# Patient Record
Sex: FEMALE | Race: WHITE
[De-identification: names, ages, dates, MRNs, and addresses within clinical notes are randomized per-mention and may not be internally consistent; named-entity substitution may affect disease eponyms.]

---

## 2017-05-23 ENCOUNTER — HOSPITAL ENCOUNTER (EMERGENCY)
Dept: HOSPITAL 56 - MW.ED | Age: 27
Discharge: HOME | End: 2017-05-23
Payer: COMMERCIAL

## 2017-05-23 VITALS — DIASTOLIC BLOOD PRESSURE: 70 MMHG | SYSTOLIC BLOOD PRESSURE: 131 MMHG
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
## 2017-05-23 DIAGNOSIS — J45.909: ICD-10-CM

## 2017-05-23 DIAGNOSIS — Z90.49: ICD-10-CM

## 2017-05-23 DIAGNOSIS — F17.210: ICD-10-CM
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
## 2017-05-23 DIAGNOSIS — Z88.5: ICD-10-CM

## 2017-05-23 DIAGNOSIS — S69.92XA: Primary | ICD-10-CM

## 2017-05-23 DIAGNOSIS — Z88.2: ICD-10-CM

## 2017-05-23 NOTE — EDM.PDOC
ED HPI GENERAL MEDICAL PROBLEM





- General


Chief Complaint: Upper Extremity Injury/Pain


Stated Complaint: HURT LEFT WRIST


Time Seen by Provider: 05/23/17 17:14





- History of Present Illness


INITIAL COMMENTS - FREE TEXT/NARRATIVE: 





HISTORY AND PHYSICAL:





History of present illness:


Patient is 27-year-old white female she is a concern of acute left wrist injury 

this occurred at work when she slipped striking her left wrist she denies other 

trauma or concern





Review of systems: 


As per history of present illness and below otherwise all systems reviewed and 

negative.





Past medical history: 


As per history of present illness and as reviewed below otherwise 

noncontributory.





Surgical history: 


As per history of present illness and as reviewed below otherwise 

noncontributory.





Social history: 


No reported history of drug or alcohol abuse.





Family history: 


As per history of present illness and as reviewed below otherwise 

noncontributory.





Physical exam:


HEENT: Atraumatic, normocephalic, pupils reactive, negative for conjunctival 

pallor or scleral icterus, mucous membranes moist, throat clear, neck supple, 

nontender, trachea midline.


Lungs: Clear to auscultation, breath sounds equal bilaterally, chest nontender.


Heart: S1S2, regular, negative for clicks, rubs, or JVD.


Abdomen: Soft, nondistended, nontender. Negative for masses or 

hepatosplenomegaly. Negative for costovertebral tenderness.


Pelvis: Stable nontender.


Genitourinary: Deferred.


Rectal: Deferred.


Extremities: No swelling noted just mild tenderness in the lateral aspect of 

her wrist to palpation and no crepitation no point tenderness no snuffbox 

tenderness CMS and neurovascular exams unremarkable


Neuro: Awake, alert, oriented. Cranial nerves II through XII unremarkable. 

Cerebellum unremarkable. Motor and sensory unremarkable throughout. Exam 

nonfocal.





Diagnostics:


X-ray left wrist





Therapeutics:


Be determined





Impression: 


#1 acute left wrist injury





Definitive disposition and diagnosis as appropriate pending reevaluation and 

review of above.





- Related Data


 Allergies











Allergy/AdvReac Type Severity Reaction Status Date / Time


 


morphine Allergy  Vomiting Verified 02/28/16 10:42


 


Sulfa (Sulfonamide Allergy  Rash Verified 02/28/16 10:42





Antibiotics)     











Home Meds: 


 Home Meds





Albuterol Sulfate [Albuterol Sulfate HFA] 8.5 gm IH Q6H PRN 06/30/14 [History]


Albuterol [Proventil Neb Soln] 0.63 mg NEB Q6H 06/30/14 [History]











Past Medical History


Respiratory History: Reports: Asthma


Other OB/BYN History: hysterectomy





- Past Surgical History


GI Surgical History: Reports: Cholecystectomy





Social & Family History





- Family History


Family Medical History: Noncontributory





- Tobacco Use


Smoking Status *Q: Current Every Day Smoker


Years of Tobacco use: 12


Packs/Tins Daily: 0.5


Second Hand Smoke Exposure: No





- Alcohol Use


Days Per Week of Alcohol Use: 0





- Recreational Drug Use


Recreational Drug Use: No





Review of Systems





- Review of Systems


Review Of Systems: ROS reveals no pertinent complaints other than HPI.





Trauma Exam





- Physical Exam


Exam: See Below (See dictation)





Course





- Orders/Labs/Meds


Orders: 





 Active Orders 24 hr











 Category Date Time Status


 


 Wrist Comp Min 3V Lt [CR] Stat Exams  05/23/17 17:16 Ordered














Departure





- Departure


Time of Disposition: 17:19


Disposition: Home, Self-Care 01


Condition: good


Clinical Impression: 


 Wrist injury








- Discharge Information


Forms:  ED Department Discharge


Additional Instructions: 


The following information is given to patients seen in the emergency department 

who are being discharged to home. This information is to outline your options 

for follow-up care. We provide all patients seen in our emergency department 

with a follow-up referral.





The need for follow-up, as well as the timing and circumstances, are variable 

depending upon the specifics of your emergency department visit.





If you don't have a primary care physician on staff, we will provide you with a 

referral. We always advise you to contact your personal physician following an 

emergency department visit to inform them of the circumstance of the visit and 

for follow-up with them and/or the need for any referrals to a consulting 

specialist.





The emergency department will also refer you to a specialist when appropriate. 

This referral assures that you have the opportunity for followup care with a 

specialist. All of these measure are taken in an effort to provide you with 

optimal care, which includes your followup.





Under all circumstances we always encourage you to contact your private 

physician who remains a resource for coordinating  your care. When calling for 

followup care, please make the office aware that this follow-up is from your 

recent emergency room visit. If for any reason you are refused follow-up, 

please contact the [facility] emergency department at [phone] 

and asked to speak to the emergency department charge nurse.




















Ace wrap as directed Motrin or Tylenol as directed return as needed as 

discussed follow up primary medical doctor in one to 2 days








- My Orders


Last 24 Hours: 





My Active Orders





05/23/17 17:16


Wrist Comp Min 3V Lt [CR] Stat 














- Assessment/Plan


Last 24 Hours: 





My Active Orders





05/23/17 17:16


Wrist Comp Min 3V Lt [CR] Stat

## 2017-05-24 NOTE — CR
EXAM DATE: 17



PATIENT'S AGE: 27



Patient: SURY MASON



Facility: [place], [place]

Patient ID: [id number]

Site Patient ID: [id number].

Site Accession #: [id number].

: 1990

Study: XRay Extremity wrist [id number]/23/2017 5:51:09 PM

Ordering Physician: Jose Evans



Final Report: 

HISTORY:

Fall, left wrist pain.



TECHNIQUE:

Three views of left wrist.



COMPARISON:

No prior.



FINDINGS:

No acute fracture or acute malalignment. Joint spaces maintained. Ulnar minus 
variance. No radiopaque foreign body or abnormal soft tissue gas.



IMPRESSION:

1. No acute fracture.

2. Ulnar minus variance.



Dictated by Gabriele Lewis MD @ 2017 6:22:20 PM



Dictated by: Gabriele Lewis MD @ 2017 18:22:30

(Electronic Signature)



Report Signed by Proxy.
ANNE

## 2018-01-08 ENCOUNTER — HOSPITAL ENCOUNTER (EMERGENCY)
Dept: HOSPITAL 56 - MW.ED | Age: 28
Discharge: HOME | End: 2018-01-08
Payer: COMMERCIAL

## 2018-01-08 VITALS — SYSTOLIC BLOOD PRESSURE: 162 MMHG | DIASTOLIC BLOOD PRESSURE: 92 MMHG

## 2018-01-08 DIAGNOSIS — J45.909: Primary | ICD-10-CM

## 2018-01-08 DIAGNOSIS — Z88.2: ICD-10-CM

## 2018-01-08 DIAGNOSIS — J06.9: ICD-10-CM

## 2018-01-08 DIAGNOSIS — Z88.5: ICD-10-CM

## 2018-01-08 DIAGNOSIS — F17.210: ICD-10-CM

## 2018-01-08 NOTE — EDM.PDOC
ED HPI GENERAL MEDICAL PROBLEM





- General


Chief Complaint: General


Stated Complaint: BODYACHES


Time Seen by Provider: 01/08/18 08:29


Source of Information: Reports: Patient


History Limitations: Reports: No Limitations





- History of Present Illness


INITIAL COMMENTS - FREE TEXT/NARRATIVE: 


History of present illness:


[]Patient is at 4 days of cough, sore throat, hot and cold flashes and sinus 

congestion. Patient was sent home from work and told to be evaluated by a 

physician. She was unable to get into her primary care physician's office. 

Patient does use albuterol inhaler and has a nebulizer at home which she has 

run out of her solution. Patient is also a smoker. She denies any vomiting, 

diarrhea or body aches.





Review of systems: 


As per history of present illness and below otherwise all systems reviewed and 

negative.





Past medical history: 


As per history of present illness and as reviewed below otherwise 

noncontributory.





Surgical history: 


As per history of present illness and as reviewed below otherwise 

noncontributory.





Social history: 


No reported history of drug or alcohol abuse.





Family history: 


As per history of present illness and as reviewed below otherwise 

noncontributory.





Physical exam:


General: Well developed, well nourished in NAD


HEENT: Atraumatic, normocephalic, pupils reactive, negative for conjunctival 

pallor or scleral icterus, mucous membranes moist, throat clear, neck supple, 

nontender, trachea midline.


Lungs: Clear to auscultation, breath sounds equal bilaterally, chest nontender.


Heart: S1S2, regular, negative for clicks, rubs, or JVD.


Abdomen: Soft, nondistended, nontender. Negative for masses or 

hepatosplenomegaly. Negative for costovertebral tenderness.


Pelvis: Stable nontender.


Genitourinary: Deferred.


Rectal: Deferred.


Extremities: Atraumatic, negative for cords or calf pain. Neurovascular 

unremarkable.


Neuro: Awake, alert, oriented. Cranial nerves II through XII unremarkable. 

Cerebellum unremarkable. Motor and sensory unremarkable throughout. Exam 

nonfocal.





Diagnostics:


[]





Therapeutics:


[]





Impression: 


[]Upper respiratory infection, asthma





Plan:


[]Med refill for albuterol solution and inhaler and Z-Pipo.





Definitive disposition and diagnosis as appropriate pending reevaluation and 

review of above.





  ** Bilateral Generalized


Pain Score (Numeric/FACES): 4





- Related Data


 Allergies











Allergy/AdvReac Type Severity Reaction Status Date / Time


 


codeine Allergy  Hives Verified 01/08/18 08:25


 


morphine Allergy  Vomiting Verified 01/08/18 08:25


 


Sulfa (Sulfonamide Allergy  Rash Verified 01/08/18 08:25





Antibiotics)     











Home Meds: 


 Home Meds





Albuterol Sulfate [Albuterol Sulfate HFA] 8.5 gm IH Q6H PRN 06/30/14 [History]


Albuterol [Proventil Neb Soln] 0.63 mg NEB Q6H 06/30/14 [History]


Albuterol Sulfate 0.63 mg IH Q4HR PRN #20 ml 01/08/18 [Rx]


Albuterol Sulfate [Ventolin Hfa] 8 gm IH Q4HR PRN #1 hfa.aer.ad 01/08/18 [Rx]


Azithromycin [Zithromax] 250 mg PO ASDIRECTED #6 tablet 01/08/18 [Rx]











Past Medical History


Respiratory History: Reports: Asthma


Other OB/BYN History: hysterectomy





- Past Surgical History


GI Surgical History: Reports: Cholecystectomy





Social & Family History





- Family History


Family Medical History: Noncontributory





- Tobacco Use


Smoking Status *Q: Current Every Day Smoker


Years of Tobacco use: 16


Packs/Tins Daily: 0.5


Second Hand Smoke Exposure: No





- Caffeine Use


Caffeine Use: Reports: Coffee, Energy Drinks





- Alcohol Use


Days Per Week of Alcohol Use: 0





- Recreational Drug Use


Recreational Drug Use: No





ED ROS GENERAL





- Review of Systems


Review Of Systems: See Below (See history of present illness)





ED EXAM, GENERAL





- Physical Exam


Exam: See Below (See history of present illness)





Course





- Vital Signs


Last Recorded V/S: 





 Last Vital Signs











Temp  96.8 F   01/08/18 08:21


 


Pulse  56 L  01/08/18 08:21


 


Resp  18   01/08/18 08:21


 


BP  133/63   01/08/18 08:21


 


Pulse Ox  98   01/08/18 08:21














Departure





- Departure


Time of Disposition: 08:41


Disposition: Home, Self-Care 01


Condition: Good


Clinical Impression: 


 Upper respiratory infection








- Discharge Information


Prescriptions: 


Albuterol Sulfate 0.63 mg IH Q4HR PRN #20 ml


 PRN Reason: Shortness Of Breath


Albuterol Sulfate [Ventolin Hfa] 8 gm IH Q4HR PRN #1 hfa.aer.ad


 PRN Reason: Shortness Of Breath


Azithromycin [Zithromax] 250 mg PO ASDIRECTED #6 tablet


Referrals: 


Yudi Santiago DO [Primary Care Provider] - 


Additional Instructions: 


The following information is given to patients seen in the emergency department 

who are being discharged to home. This information is to outline your options 

for follow-up care. We provide all patients seen in our emergency department 

with a follow-up referral.





The need for follow-up, as well as the timing and circumstances, are variable 

depending upon the specifics of your emergency department visit.





If you don't have a primary care physician on staff, we will provide you with a 

referral. We always advise you to contact your personal physician following an 

emergency department visit to inform them of the circumstance of the visit and 

for follow-up with them and/or the need for any referrals to a consulting 

specialist.





The emergency department will also refer you to a specialist when appropriate. 

This referral assures that you have the opportunity for follow-up care with a 

specialist. All of these measure are taken in an effort to provide you with 

optimal care, which includes your follow-up.





Under all circumstances we always encourage you to contact your private 

physician who remains a resource for coordinating your care. When calling for 

follow-up care, please make the office aware that this follow-up is from your 

recent emergency room visit. If for any reason you are refused follow-up, 

please contact the [facility] Emergency 

Department at [phone] and asked to speak to the emergency department 

charge nurse.





Use albuterol and Zithromax as directed follow-up with PMD return if symptoms 

worsen or change. 


[facility]


Primary Care


[address] [place] [id number]


Phone: [phone]


Fax: [phone]

## 2019-06-25 ENCOUNTER — HOSPITAL ENCOUNTER (EMERGENCY)
Dept: HOSPITAL 56 - MW.ED | Age: 29
Discharge: HOME | End: 2019-06-25
Payer: COMMERCIAL

## 2019-06-25 VITALS — DIASTOLIC BLOOD PRESSURE: 62 MMHG | SYSTOLIC BLOOD PRESSURE: 109 MMHG

## 2019-06-25 DIAGNOSIS — Z88.5: ICD-10-CM

## 2019-06-25 DIAGNOSIS — J45.909: ICD-10-CM

## 2019-06-25 DIAGNOSIS — Z98.84: ICD-10-CM

## 2019-06-25 DIAGNOSIS — Z79.899: ICD-10-CM

## 2019-06-25 DIAGNOSIS — R10.9: Primary | ICD-10-CM

## 2019-06-25 DIAGNOSIS — Z88.2: ICD-10-CM

## 2019-06-25 LAB
CHLORIDE SERPL-SCNC: 106 MMOL/L (ref 98–107)
SODIUM SERPL-SCNC: 143 MMOL/L (ref 136–145)

## 2019-06-25 NOTE — CT
CT of the abdomen and pelvis without contrast.

 

HISTORY: Pain

 

TECHNIQUE: Axial CT images were obtained of the abdomen and pelvis without

contrast. Coronal and sagittal reconstructions obtained.

 

FINDINGS: 

The lung bases are clear, no pleural effusion.

 

The liver, spleen, adrenal glands, and pancreas appear unremarkable for

noncontrast examination. Cholecystectomy. There is no bulky retroperitoneal

lymphadenopathy. No abdominal ascites. Postsurgical changes noted secondary to

gastric sleeve.

 

There are no calcifications noted within the kidneys or along the courses of the

ureters bilaterally.

 

The large and small bowel are normal in caliber without evidence of obstruction.

The appendix appears normal. There is no bulky pelvic lymphadenopathy. No free

fluid. No free air. The urinary bladder appears normal. There is a 4 x 3.1 cm

pelvic mass of fatty density.

 

The visualized osseous structures appear normal.

 

IMPRESSION: 

1.  No acute findings within the abdomen or pelvis.  

2.  Right pelvic dermoid versus lipoma. d/w dr landin - no fx. djd, osteopenia.

## 2019-06-25 NOTE — EDM.PDOC
ED HPI GENERAL MEDICAL PROBLEM





- General


Chief Complaint: Abdominal Pain


Stated Complaint: sharp pain in stomach


Time Seen by Provider: 06/25/19 08:27





- History of Present Illness


INITIAL COMMENTS - FREE TEXT/NARRATIVE: 


HISTORY AND PHYSICAL:





History of present illness:


Patient 29-year-old white female with past medical history significant for 

gastric sleeve presents with intermittent abdominal pain this is the third 

episode she's had a undifferentiated abdominal pain without associated vomiting 

diarrhea fever or chills no chest pain or shortness of breath she has notified 

her gastric surgeon regarding this.





Review of systems: 


As per history of present illness and below otherwise all systems reviewed and 

negative.





Past medical history: 


As per history of present illness and as reviewed below otherwise 

noncontributory.





Surgical history: 


As per history of present illness and as reviewed below otherwise 

noncontributory.





Social history: 


No reported history of drug or alcohol abuse.





Family history: 


As per history of present illness and as reviewed below otherwise 

noncontributory.





Physical exam:


HEENT: Atraumatic, normocephalic, pupils reactive, negative for conjunctival 

pallor or scleral icterus, mucous membranes moist, throat clear, neck supple, 

nontender, trachea midline.


Lungs: Clear to auscultation, breath sounds equal bilaterally, chest nontender.


Heart: S1S2, regular, negative for clicks, rubs, or JVD.


Abdomen: Soft, nondistended, no localized tenderness. Negative for masses or 

hepatosplenomegaly. Negative for costovertebral tenderness.


Pelvis: Stable nontender.


Genitourinary: Deferred.


Rectal: Deferred.


Extremities: Atraumatic, negative for cords or calf pain. Neurovascular 

unremarkable.


Neuro: Awake, alert, oriented. Cranial nerves II through XII unremarkable. 

Cerebellum unremarkable. Motor and sensory unremarkable throughout. Exam 

nonfocal.





Diagnostics:


CBC CMP UA hCG lipase chest x-ray





Therapeutics:


None





Impression: 


#1 intermittent abdominal pain #2 history of gastric sleeve





Definitive disposition and diagnosis as appropriate pending reevaluation and 

review of above.








- Related Data


 Allergies











Allergy/AdvReac Type Severity Reaction Status Date / Time


 


codeine Allergy  Hives Verified 06/25/19 08:27


 


morphine Allergy  Vomiting Verified 06/25/19 08:27


 


Sulfa (Sulfonamide Allergy  Rash Verified 06/25/19 08:27





Antibiotics)     











Home Meds: 


 Home Meds





Albuterol Sulfate [Albuterol Sulfate HFA] 8.5 gm IH Q6H PRN 06/30/14 [History]


Albuterol [Proventil Neb Soln] 0.63 mg NEB Q6H 06/30/14 [History]


Albuterol Sulfate 0.63 mg IH Q4HR PRN #20 ml 01/08/18 [Rx]


Albuterol Sulfate [Ventolin Hfa] 8 gm IH Q4HR PRN #1 hfa.aer.ad 01/08/18 [Rx]


Cyanocobalamin (Vitamin B12) [Vitamin B12]  06/25/19 [History]


Ferrous Sulfate, Dried [Iron]  06/25/19 [History]


Multivitamins [Tab-A-Maura]  06/25/19 [History]











Past Medical History


Respiratory History: Reports: Asthma


Other OB/GYN History: hysterectomy





- Past Surgical History


GI Surgical History: Reports: Cholecystectomy





Social & Family History





- Family History


Family Medical History: Noncontributory





- Caffeine Use


Caffeine Use: Reports: Coffee, Energy Drinks





ED ROS GENERAL





- Review of Systems


Review Of Systems: ROS reveals no pertinent complaints other than HPI.





ED EXAM, GENERAL





- Physical Exam


Exam: See Below (See dictation)





Course





- Vital Signs


Last Recorded V/S: 


 Last Vital Signs











Temp  36.4 C   06/25/19 08:31


 


Pulse  70   06/25/19 08:31


 


Resp  16   06/25/19 08:31


 


BP  130/84   06/25/19 08:31


 


Pulse Ox  97   06/25/19 08:31














- Orders/Labs/Meds


Labs: 


 Laboratory Tests











  06/25/19 06/25/19 06/25/19 Range/Units





  08:39 08:50 08:50 


 


WBC   6.26   (4.0-11.0)  K/uL


 


RBC   3.87 L   (4.30-5.90)  M/uL


 


Hgb   11.8 L   (12.0-16.0)  g/dL


 


Hct   36.9   (36.0-46.0)  %


 


MCV   95.3   (80.0-98.0)  fL


 


MCH   30.5   (27.0-32.0)  pg


 


MCHC   32.0   (31.0-37.0)  g/dL


 


RDW Std Deviation   45.3   (28.0-62.0)  fl


 


RDW Coeff of Mendoza   13   (11.0-15.0)  %


 


Plt Count   239   (150-400)  K/uL


 


MPV   10.60   (7.40-12.00)  fL


 


Neut % (Auto)   56.7   (48.0-80.0)  %


 


Lymph % (Auto)   32.9   (16.0-40.0)  %


 


Mono % (Auto)   7.2   (0.0-15.0)  %


 


Eos % (Auto)   2.9   (0.0-7.0)  %


 


Baso % (Auto)   0.3   (0.0-1.5)  %


 


Neut # (Auto)   3.6   (1.4-5.7)  K/uL


 


Lymph # (Auto)   2.1   (0.6-2.4)  K/uL


 


Mono # (Auto)   0.5   (0.0-0.8)  K/uL


 


Eos # (Auto)   0.2   (0.0-0.7)  K/uL


 


Baso # (Auto)   0.0   (0.0-0.1)  K/uL


 


Nucleated RBC %   0.0   /100WBC


 


Nucleated RBCs #   0   K/uL


 


Sodium    143  (136-145)  mmol/L


 


Potassium    3.8  (3.5-5.1)  mmol/L


 


Chloride    106  ()  mmol/L


 


Carbon Dioxide    28.9  (21.0-32.0)  mmol/L


 


BUN    17  (7.0-18.0)  mg/dL


 


Creatinine    0.7  (0.6-1.0)  mg/dL


 


Est Cr Clr Drug Dosing    100.55  mL/min


 


Estimated GFR (MDRD)    > 60.0  ml/min


 


Glucose    86  ()  mg/dL


 


Calcium    8.5  (8.5-10.1)  mg/dL


 


Total Bilirubin    0.5  (0.2-1.0)  mg/dL


 


AST    11 L  (15-37)  IU/L


 


ALT    9 L  (14-63)  IU/L


 


Alkaline Phosphatase    48  ()  U/L


 


Total Protein    7.2  (6.4-8.2)  g/dL


 


Albumin    3.5  (3.4-5.0)  g/dL


 


Globulin    3.7  (2.6-4.0)  g/dL


 


Albumin/Globulin Ratio    0.9  (0.9-1.6)  


 


Lipase    112  ()  U/L


 


Urine Color  YELLOW    


 


Urine Appearance  CLEAR    


 


Urine pH  6.5    (5.0-8.0)  


 


Ur Specific Gravity  1.020    (1.001-1.035)  


 


Urine Protein  NEGATIVE    (NEGATIVE)  mg/dL


 


Urine Glucose (UA)  NEGATIVE    (NEGATIVE)  mg/dL


 


Urine Ketones  NEGATIVE    (NEGATIVE)  mg/dL


 


Urine Occult Blood  NEGATIVE    (NEGATIVE)  


 


Urine Nitrite  NEGATIVE    (NEGATIVE)  


 


Urine Bilirubin  NEGATIVE    (NEGATIVE)  


 


Urine Urobilinogen  0.2    (<2.0)  EU/dL


 


Ur Leukocyte Esterase  NEGATIVE    (NEGATIVE)  














Departure





- Departure


Time of Disposition: 10:00


Disposition: Home, Self-Care 01


Condition: Good


Clinical Impression: 


 Abdominal pain








- Discharge Information


Forms:  ED Department Discharge


Additional Instructions: 


The following information is given to patients seen in the emergency department 

who are being discharged to home. This information is to outline your options 

for follow-up care. We provide all patients seen in our emergency department 

with a follow-up referral.





The need for follow-up, as well as the timing and circumstances, are variable 

depending upon the specifics of your emergency department visit.





If you don't have a primary care physician on staff, we will provide you with a 

referral. We always advise you to contact your personal physician following an 

emergency department visit to inform them of the circumstance of the visit and 

for follow-up with them and/or the need for any referrals to a consulting 

specialist.





The emergency department will also refer you to a specialist when appropriate. 

This referral assures that you have the opportunity for followup care with a 

specialist. All of these measure are taken in an effort to provide you with 

optimal care, which includes your followup.





Under all circumstances we always encourage you to contact your private 

physician who remains a resource for coordinating  your care. When calling for 

followup care, please make the office aware that this follow-up is from your 

recent emergency room visit. If for any reason you are refused follow-up, 

please contact the [facility] emergency department at [phone] 

and asked to speak to the emergency department charge nurse.














Follow-up primary medical doctor/bariatric surgeon as needed as discussed 

return as needed as discussed